# Patient Record
Sex: FEMALE | Race: WHITE
[De-identification: names, ages, dates, MRNs, and addresses within clinical notes are randomized per-mention and may not be internally consistent; named-entity substitution may affect disease eponyms.]

---

## 2024-01-05 PROBLEM — I34.0 MITRAL REGURGITATION: Status: ACTIVE | Noted: 2024-01-05

## 2024-01-08 ENCOUNTER — APPOINTMENT (OUTPATIENT)
Dept: CARDIOTHORACIC SURGERY | Facility: CLINIC | Age: 74
End: 2024-01-08
Payer: MEDICARE

## 2024-01-08 ENCOUNTER — NON-APPOINTMENT (OUTPATIENT)
Age: 74
End: 2024-01-08

## 2024-01-08 DIAGNOSIS — Z87.891 PERSONAL HISTORY OF NICOTINE DEPENDENCE: ICD-10-CM

## 2024-01-08 DIAGNOSIS — Z82.3 FAMILY HISTORY OF STROKE: ICD-10-CM

## 2024-01-08 DIAGNOSIS — I34.0 NONRHEUMATIC MITRAL (VALVE) INSUFFICIENCY: ICD-10-CM

## 2024-01-08 PROCEDURE — 99204 OFFICE O/P NEW MOD 45 MIN: CPT

## 2024-01-08 RX ORDER — NICOTINE 7MG/24HR
7 PATCH, TRANSDERMAL 24 HOURS TRANSDERMAL
Refills: 0 | Status: DISCONTINUED | COMMUNITY
End: 2024-01-08

## 2024-01-09 NOTE — END OF VISIT
[Time Spent: ___ minutes] : I have spent [unfilled] minutes of time on the encounter. [FreeTextEntry3] : I, JAVAD Dhillon , personally performed the evaluation and management (E/M) services for this new patient.  That E/M includes conducting the clinically appropriate initial history &/or exam, assessing all conditions, and establishing the plan of care.  Today, my CARA, was here to observe &/or participate in the visit & follow plan of care established by me.

## 2024-01-09 NOTE — PHYSICAL EXAM
[General Appearance - Alert] : alert [General Appearance - In No Acute Distress] : in no acute distress [Sclera] : the sclera and conjunctiva were normal [Extraocular Movements] : extraocular movements were intact [Outer Ear] : the ears and nose were normal in appearance [Neck Cervical Mass (___cm)] : no neck mass was observed [Neck Appearance] : the appearance of the neck was normal [Auscultation Breath Sounds / Voice Sounds] : lungs were clear to auscultation bilaterally [Heart Sounds Gallop] : no gallops [Heart Sounds] : normal S1 and S2 [2+] : left 2+ [Bowel Sounds] : normal bowel sounds [Abdomen Tenderness] : non-tender [Abdomen Soft] : soft [] : no hepato-splenomegaly [Abdomen Mass (___ Cm)] : no abdominal mass palpated [Abnormal Walk] : normal gait [Skin Color & Pigmentation] : normal skin color and pigmentation [Cranial Nerves] : cranial nerves 2-12 were intact [Oriented To Time, Place, And Person] : oriented to person, place, and time [Right Carotid Bruit] : no bruit heard over the right carotid [Left Carotid Bruit] : no bruit heard over the left carotid [FreeTextEntry1] : Deferred

## 2024-01-09 NOTE — HISTORY OF PRESENT ILLNESS
[FreeTextEntry1] : 72 yo F , recent former smoker (quit 12/2023) with PMHx of HTN, newly diagnosed sCHF (EF 25%), Afib (on Eliquis), CAD, severe MR, severe AI who presents for surgical consultation for management of CAD, severe MR, severe AI and ascending aorta dilatation. Pt has an appointment with cardiologist Dr. Rock however has not established care as of yet.   Pt recently presented to [facility] with reports of palpitations, LOPEZ with ambulating 50 feet and elevated BP at home, was found to be overloaded on exam and was admitted for new onset CHF exacerbation and new onset Afib from 12/26-12/31/23. Pt was IV diuresed and treated w/ Amiodarone. She was discharged home to follow up with CTS as outpatient. Pt underwent the following tests during this hospitalization.  - ECHO revealing EF 25%, RV normal size, LA dilated, severe MR, severe AI, PASP 35mmHg, ascending aorta 4.7cm.  - JEVON 12/28/23 revealing severe global hypokinesis of the LV, EF 30%, RV normal size, LA dilated, no evidence of LA thrombus, small PFO, mod-sev MR, trileflet AV with severe AI, mod TR, ascending aorta dilatation of 5.9cm, mod plaque in the descending aorta and arch.  - R/LHC 12/28/23 revealed dLAD filled by collaterals from RPDA, mLAD 99% culprit lesion, LCx normal, mRCA 50%, iFR 0.9, mod-sev AI, dilated aortic root and arch. PCWP 10mmHg. No pulm HTN.  - CTA chest/abd/pelvis 12/29/23 revealed aortic root 4.2cm, ascending aorta 6.2cm, descending aorta 3.5 cm, beaded appearance of the descending thoracic and abdominal aorta suggestive of vasculitis such as fibromuscular dysplasia.  - Pt underwent vasculitis workup, results pending.  - B/L LE Duplex 12/27/23 neg for DVT.  - Carotid duplex 12/30/23 without significant stenosis.   Pt presents accompanied by her daughter in law. Pt currently reports feeling better than she did in the hospital. She does endorse decreased exercise tolerance, unable to walk more than 2 blocks without having to stop as well as fatigue. Pt denies fever, chills, fatigue, headache, blurred vision, dizziness, syncope, chest pain, palpitations, orthopnea, paroxysmal nocturnal dyspnea, nausea, vomiting, abdominal pain, back pain, BRBPR or swelling to legs.

## 2024-01-09 NOTE — PROCEDURE
[FreeTextEntry1] : Patient was advised to view the educational video prior to this visit regarding aortic pathology, risk factors, surgical procedures, and lifestyle modifications. Video can be retrieved at [email or web address]

## 2024-01-09 NOTE — ASSESSMENT
[FreeTextEntry1] : 74 yo F , recent former smoker (quit 12/2023) with PMHx of HTN, newly diagnosed sCHF (EF 25%), Afib (on Eliquis), CAD, severe MR, severe AI who presents for surgical consultation for management of CAD, severe MR, severe AI and ascending aorta dilatation.  I have discussed the indications for surgery which include: decrease ejection fraction and worsening aortic regurgitation on echocardiogram, signs and symptoms of congestive heart failure, and other necessary cardiac procedures such as coronary artery bypass grafting, aortic root and ascending aorta replacement.  I have reviewed the patient's medical records, diagnostic images during the time of this office consultation and have made the following recommendation. I have reviewed the indications for surgery and used our webpage [email or web address] [email or web address] to illustrate the aorta and anatomy of the heart. I have discussed the indications for surgery with the patient. Those indications are the following: size greater than 5.0 cm, symptomatic aneurysms, family history of aortic dissection or aneurysm death with a size greater than 4.5 cm, other necessary cardiac procedures such as coronary artery bypass grafting or valvular disorders with an aneurysm greater than 4.5 cm, or connective tissue disorders with an aneurysm size greater than 4.5 cm. The patient meets the indications.  I had a lengthy discussion with the patient regarding his aneurysmal and valvular disease and progression. I have recommended that the patient is a candidate for .   I have discussed the risks, benefits and alternatives to surgery. I have explained the risks of the surgery, including approximately 5-10 % major mortality or morbidity including stroke, infection, bleeding, death, renal failure and heart attack. There is a 2 - 4% risk of requiring a PPM in patients with aortic regurgitation and a 4-6% risk for patients with aortic stenosis. There is a 20% risk of temporary atrial fibrillation post-surgery. All questions were addressed, and patient agrees to proceed with surgery.   I had a lengthy discussion with the patient regarding his valvular disease and progression. I have recommended that the patient is a candidate for aortic root, ascending aorta replacement, bioprosthetic aortic valve replacement, single vessel bypass of the LAD, RICKI clip with possible cryomaze and bioprosthetic mitral valve replacement.    The patient was educated on various valve options.  I have described the mechanical valve prosthesis which does require Coumadin therapy with a daily pill as well as frequent lab tests. The mechanical valve has excellent longevity and is usually only removed in the cases of infective bacterial prosthetic valve endocarditis or pannus formation. Approximately over 90% of mechanical valves never need to be removed. However, there is a risk with Coumadin, a blood thinner, approximately a 1% thromboembolic risk per year. The On-x mechanical valve was also discussed, which requires a lower Coumadin dosage and INR therapeutic range. The other valve option is a biological valve. In general, approximately 50% of these need to be removed at approximately 15 years. However, these valves do not require Coumadin therapy and, therefore, do not have the associated risks of the blood thinner. I discussed that with the current use of Transcatheter Aortic Valve Replacement (TAVR), there is a possibility that future replacement of a failing bioprosthetic valve by TAVR may be an option. The Inspira and MagnaEase valves and their morphology fitted for future TAVRs was discussed as well.   The patient has chosen a bioprosthetic aortic valve.   --dental clearance   - Continue medication regimen. - Follow up with cardiologist and PCP. - Blood pressure management. - Discussed signs and symptoms that warrant emergency medical attention. - Pt is agreeable to surgery- date TBD, likely Thursday 1/18/24 with admission 2 days prior for HF optimization.

## 2024-01-10 ENCOUNTER — APPOINTMENT (OUTPATIENT)
Dept: CARDIOLOGY | Facility: CLINIC | Age: 74
End: 2024-01-10
Payer: MEDICARE

## 2024-01-10 ENCOUNTER — NON-APPOINTMENT (OUTPATIENT)
Age: 74
End: 2024-01-10

## 2024-01-10 VITALS
OXYGEN SATURATION: 97 % | WEIGHT: 120 LBS | HEIGHT: 64 IN | BODY MASS INDEX: 20.49 KG/M2 | SYSTOLIC BLOOD PRESSURE: 129 MMHG | HEART RATE: 52 BPM | DIASTOLIC BLOOD PRESSURE: 72 MMHG

## 2024-01-10 PROCEDURE — G2211 COMPLEX E/M VISIT ADD ON: CPT

## 2024-01-10 PROCEDURE — 99205 OFFICE O/P NEW HI 60 MIN: CPT

## 2024-01-10 PROCEDURE — 93000 ELECTROCARDIOGRAM COMPLETE: CPT

## 2024-01-10 NOTE — PHYSICAL EXAM
[Well Developed] : well developed [Well Nourished] : well nourished [No Acute Distress] : no acute distress [Normal Conjunctiva] : normal conjunctiva [Normal Venous Pressure] : normal venous pressure [No Carotid Bruit] : no carotid bruit [Normal S1, S2] : normal S1, S2 [No Rub] : no rub [No Gallop] : no gallop [Clear Lung Fields] : clear lung fields [Good Air Entry] : good air entry [No Respiratory Distress] : no respiratory distress  [Soft] : abdomen soft [Non Tender] : non-tender [No Masses/organomegaly] : no masses/organomegaly [Normal Bowel Sounds] : normal bowel sounds [Normal Gait] : normal gait [No Edema] : no edema [No Cyanosis] : no cyanosis [No Clubbing] : no clubbing [No Varicosities] : no varicosities [No Rash] : no rash [No Skin Lesions] : no skin lesions [Moves all extremities] : moves all extremities [No Focal Deficits] : no focal deficits [Normal Speech] : normal speech [Alert and Oriented] : alert and oriented [Normal memory] : normal memory [de-identified] : 2/6 systolic murmur.  2/4 diastolic murmur.

## 2024-01-10 NOTE — HISTORY OF PRESENT ILLNESS
[FreeTextEntry1] : This is a 73 year old former smoker who presents to the office to establish care with a cardiologist.  In December of 2023 she presented to [facility] in [place] with acute decompensated heart failure.  She was found to be in atrial fibrillation as well.  She underwent a cardiac work up, which revealed a severely dilated ascending aorta measuring 6 cm, severe aortic insufficiency, moderate to severe mitral insufficiency, and a severe mid LAD stenosis.  She was diuresed, put on AC, started on GDMT, and sinus rhythm was restored.  She has seen Annamaria Oro and Maximilian, and is deciding about surgery.  She is here for an opinion regarding her candidacy and the need for surgery.  Since discharge, her dyspnea and LE edema is much improved.  She is not having chest pain. She denies  PND, orthopnea, LE swelling, dizziness, or syncope.  No abnormal bleeding.  She is able to perform her activities of daily living with no increased dyspnea.  She quit smoking when she entered the hospital.  She is medication and dietary compliant.

## 2024-01-10 NOTE — DISCUSSION/SUMMARY
[FreeTextEntry1] : This is a 73 year old former smoker who presents to the office to establish care with a cardiologist.  In December of 2023 she presented to [facility] in [place] with acute decompensated heart failure.  She was found to be in atrial fibrillation as well.  She underwent a cardiac work up, which revealed a severely dilated ascending aorta measuring 6 cm, severe aortic insufficiency, moderate to severe mitral insufficiency, and a severe mid LAD stenosis.  She was diuresed, put on AC, started on GDMT, and sinus rhythm was restored.  She has seen Annamaria Oro and Maximilian, and is deciding about surgery.  She is here for an opinion regarding her candidacy and the need for surgery.   I explained that she is unlikely to have a good outcome without surgery.    I think that she needs an ascending aortic replacement, bioprosthetic aortic valve replacement, single vessel bypass to the LAD, and possible TV repair, MV repair, surgical MAZE,and RICKI ligation.  She is well compensated at the current point.  She will continue Toprol, Entresto, Aldactone at the current doses.  She is maintaining NSR on amiodarone, and will continue AC with Eliquis for now.  I advised activity restriction, and that she schedule a surgical date ASAP.  She will follow with me thereafter.  They know to call the office with any issues.  [EKG obtained to assist in diagnosis and management of assessed problem(s)] : EKG obtained to assist in diagnosis and management of assessed problem(s)

## 2024-01-30 ENCOUNTER — APPOINTMENT (OUTPATIENT)
Dept: CARDIOLOGY | Facility: CLINIC | Age: 74
End: 2024-01-30
Payer: MEDICARE

## 2024-01-30 LAB
INR PPP: 1.3 RATIO
QUALITY CONTROL: YES

## 2024-01-30 PROCEDURE — 85610 PROTHROMBIN TIME: CPT | Mod: QW

## 2024-01-30 PROCEDURE — 93793 ANTICOAG MGMT PT WARFARIN: CPT

## 2024-02-07 ENCOUNTER — APPOINTMENT (OUTPATIENT)
Dept: CARDIOLOGY | Facility: CLINIC | Age: 74
End: 2024-02-07
Payer: MEDICARE

## 2024-02-07 VITALS
OXYGEN SATURATION: 98 % | HEART RATE: 85 BPM | DIASTOLIC BLOOD PRESSURE: 80 MMHG | SYSTOLIC BLOOD PRESSURE: 130 MMHG | WEIGHT: 120 LBS | BODY MASS INDEX: 20.49 KG/M2 | HEIGHT: 64 IN

## 2024-02-07 DIAGNOSIS — I50.20 UNSPECIFIED SYSTOLIC (CONGESTIVE) HEART FAILURE: ICD-10-CM

## 2024-02-07 DIAGNOSIS — I35.1 NONRHEUMATIC AORTIC (VALVE) INSUFFICIENCY: ICD-10-CM

## 2024-02-07 DIAGNOSIS — Z00.00 ENCOUNTER FOR GENERAL ADULT MEDICAL EXAMINATION W/OUT ABNORMAL FINDINGS: ICD-10-CM

## 2024-02-07 LAB
INR PPP: 1.1 RATIO
QUALITY CONTROL: YES

## 2024-02-07 PROCEDURE — 99215 OFFICE O/P EST HI 40 MIN: CPT

## 2024-02-07 PROCEDURE — G2211 COMPLEX E/M VISIT ADD ON: CPT

## 2024-02-07 PROCEDURE — 85610 PROTHROMBIN TIME: CPT | Mod: QW

## 2024-02-07 PROCEDURE — 93000 ELECTROCARDIOGRAM COMPLETE: CPT

## 2024-02-07 PROCEDURE — 93793 ANTICOAG MGMT PT WARFARIN: CPT

## 2024-02-07 RX ORDER — SPIRONOLACTONE 25 MG/1
25 TABLET ORAL DAILY
Refills: 0 | Status: DISCONTINUED | COMMUNITY
End: 2024-02-07

## 2024-02-07 RX ORDER — ASPIRIN 81 MG/1
81 TABLET, COATED ORAL
Qty: 90 | Refills: 3 | Status: ACTIVE | COMMUNITY
Start: 2024-02-07

## 2024-02-07 RX ORDER — METOPROLOL SUCCINATE 50 MG/1
50 TABLET, EXTENDED RELEASE ORAL DAILY
Refills: 0 | Status: DISCONTINUED | COMMUNITY
End: 2024-02-07

## 2024-02-07 RX ORDER — APIXABAN 5 MG/1
5 TABLET, FILM COATED ORAL
Qty: 180 | Refills: 0 | Status: DISCONTINUED | COMMUNITY
End: 2024-02-07

## 2024-02-07 RX ORDER — SACUBITRIL AND VALSARTAN 24; 26 MG/1; MG/1
24-26 TABLET, FILM COATED ORAL TWICE DAILY
Refills: 0 | Status: DISCONTINUED | COMMUNITY
End: 2024-02-07

## 2024-02-07 NOTE — HISTORY OF PRESENT ILLNESS
[FreeTextEntry1] : This is a 73 year old former smoker who presents to the office for a follow up cardiac evaluation after cardiac surgery.  .  In December of 2023 she presented to [facility] in [place] with acute decompensated heart failure.  She was found to be in atrial fibrillation as well.  She underwent a cardiac work up, which revealed a severely dilated ascending aorta measuring 6 cm, severe aortic insufficiency, moderate to severe mitral insufficiency, and a severe mid LAD stenosis.  She was diuresed, put on AC, started on GDMT, and sinus rhythm was restored.  She followed with Dr. Oro, and is now s/p ascending aortic replacement with a Dacron graft, bioprosthetic aortic valve replacement, mitral valve and tricuspid valve annuloplasty, 2V CABG with LIMA to LAD and SVG to RCA, RICKI ligation.  Her post operative course was complicated by jayy atrial fibrillation.  PPM was deferred.  Toprol was stopped.  Entresto and Aldactone were also stopped.  She was discharged in NSR, but is now back in AF.  Her HR is about 100.  Eliquis was changed to Coumadin.  She reports her dyspnea is slightly better than pre op.  She has incisional pain.  She denies anginal chest pains, PND, orthopnea, LE swelling, dizziness, or syncope.  NO abnormal bleeding.

## 2024-02-07 NOTE — PHYSICAL EXAM
[Well Developed] : well developed [Well Nourished] : well nourished [No Acute Distress] : no acute distress [Normal Conjunctiva] : normal conjunctiva [Normal Venous Pressure] : normal venous pressure [No Carotid Bruit] : no carotid bruit [Normal S1, S2] : normal S1, S2 [No Rub] : no rub [No Gallop] : no gallop [Clear Lung Fields] : clear lung fields [Good Air Entry] : good air entry [No Respiratory Distress] : no respiratory distress  [Soft] : abdomen soft [Non Tender] : non-tender [No Masses/organomegaly] : no masses/organomegaly [Normal Bowel Sounds] : normal bowel sounds [Normal Gait] : normal gait [No Edema] : no edema [No Cyanosis] : no cyanosis [No Clubbing] : no clubbing [No Varicosities] : no varicosities [No Rash] : no rash [No Skin Lesions] : no skin lesions [Moves all extremities] : moves all extremities [No Focal Deficits] : no focal deficits [Normal Speech] : normal speech [Alert and Oriented] : alert and oriented [Normal memory] : normal memory [de-identified] : 2/6 systolic murmur.  IRRR

## 2024-02-07 NOTE — DISCUSSION/SUMMARY
[FreeTextEntry1] : This is a 73 year old former smoker who presents to the office for a follow up cardiac evaluation after cardiac surgery.  .  In December of 2023 she presented to [facility] in [place] with acute decompensated heart failure.  She was found to be in atrial fibrillation as well.  She underwent a cardiac work up, which revealed a severely dilated ascending aorta measuring 6 cm, severe aortic insufficiency, moderate to severe mitral insufficiency, and a severe mid LAD stenosis.  She was diuresed, put on AC, started on GDMT, and sinus rhythm was restored.  She followed with Dr. Oro, and is now s/p ascending aortic replacement with a Dacron graft, bioprosthetic aortic valve replacement, mitral valve and tricuspid valve annuloplasty, 2V CABG with LIMA to LAD and SVG to RCA, RICKI ligation.  Her post operative course was complicated by jayy atrial fibrillation.  PPM was deferred.  Toprol was stopped.  Entresto and Aldactone were also stopped.  She was discharged in NSR, but is now back in AF.  She is going to be started back on Toprol 25 QD.  I am placing a Zio AT given her jayy issues in the hospital.  She will continue Coumadin for one month with a goal INR 2.5-3.5.  Her INR is being managed in our office.  At one month, she will be changed back to Eliquis.  I am sending a full set of repeat blood work.  She will need a repeat echocardiogram.  I plan to eventually add back GDMT slowly with Entresto next, followed by eplerenone and Farxiga.  She appears euvolemic with no signs of angina.  She will continue her aspirin and statin drug.  She should be treated to an LDL of less than 70.  I will see her back in 1-2 weeks.  They know to call the office with any issues.  I discussed the hospital course with DR. Oro's NP.  They are faxing records over for our review.  [EKG obtained to assist in diagnosis and management of assessed problem(s)] : EKG obtained to assist in diagnosis and management of assessed problem(s)

## 2024-02-12 ENCOUNTER — APPOINTMENT (OUTPATIENT)
Dept: CARDIOLOGY | Facility: CLINIC | Age: 74
End: 2024-02-12
Payer: MEDICARE

## 2024-02-12 PROCEDURE — 85610 PROTHROMBIN TIME: CPT | Mod: QW

## 2024-02-12 PROCEDURE — 93793 ANTICOAG MGMT PT WARFARIN: CPT

## 2024-02-13 LAB
ALBUMIN SERPL ELPH-MCNC: 3.9 G/DL
ALP BLD-CCNC: 138 U/L
ALT SERPL-CCNC: 13 U/L
ANION GAP SERPL CALC-SCNC: 13 MMOL/L
AST SERPL-CCNC: 12 U/L
BILIRUB SERPL-MCNC: 0.6 MG/DL
BUN SERPL-MCNC: 10 MG/DL
CALCIUM SERPL-MCNC: 9.2 MG/DL
CHLORIDE SERPL-SCNC: 107 MMOL/L
CHOLEST SERPL-MCNC: 132 MG/DL
CO2 SERPL-SCNC: 26 MMOL/L
CREAT SERPL-MCNC: 0.67 MG/DL
EGFR: 93 ML/MIN/1.73M2
ESTIMATED AVERAGE GLUCOSE: 105 MG/DL
GLUCOSE SERPL-MCNC: 83 MG/DL
HBA1C MFR BLD HPLC: 5.3 %
HCT VFR BLD CALC: 33.3 %
HDLC SERPL-MCNC: 44 MG/DL
HGB BLD-MCNC: 10.1 G/DL
INR PPP: 1.1 RATIO
LDLC SERPL CALC-MCNC: 63 MG/DL
MCHC RBC-ENTMCNC: 28.9 PG
MCHC RBC-ENTMCNC: 30.3 GM/DL
MCV RBC AUTO: 95.4 FL
NONHDLC SERPL-MCNC: 88 MG/DL
PLATELET # BLD AUTO: 380 K/UL
POTASSIUM SERPL-SCNC: 4.5 MMOL/L
PROT SERPL-MCNC: 6.3 G/DL
QUALITY CONTROL: YES
RBC # BLD: 3.49 M/UL
RBC # FLD: 14.2 %
SODIUM SERPL-SCNC: 146 MMOL/L
TRIGL SERPL-MCNC: 144 MG/DL
WBC # FLD AUTO: 6.64 K/UL

## 2024-02-16 ENCOUNTER — APPOINTMENT (OUTPATIENT)
Dept: CARDIOLOGY | Facility: CLINIC | Age: 74
End: 2024-02-16
Payer: MEDICARE

## 2024-02-16 ENCOUNTER — NON-APPOINTMENT (OUTPATIENT)
Age: 74
End: 2024-02-16

## 2024-02-16 VITALS
SYSTOLIC BLOOD PRESSURE: 139 MMHG | HEART RATE: 89 BPM | WEIGHT: 119 LBS | BODY MASS INDEX: 20.32 KG/M2 | DIASTOLIC BLOOD PRESSURE: 84 MMHG | OXYGEN SATURATION: 98 % | HEIGHT: 64 IN

## 2024-02-16 LAB
INR PPP: 1.2 RATIO
QUALITY CONTROL: YES

## 2024-02-16 PROCEDURE — 85610 PROTHROMBIN TIME: CPT | Mod: QW

## 2024-02-16 PROCEDURE — 93000 ELECTROCARDIOGRAM COMPLETE: CPT

## 2024-02-16 PROCEDURE — G2211 COMPLEX E/M VISIT ADD ON: CPT

## 2024-02-16 PROCEDURE — 99215 OFFICE O/P EST HI 40 MIN: CPT

## 2024-02-16 RX ORDER — WARFARIN 3 MG/1
3 TABLET ORAL DAILY
Qty: 90 | Refills: 3 | Status: DISCONTINUED | COMMUNITY
Start: 2024-02-16 | End: 2024-02-16

## 2024-02-16 RX ORDER — WARFARIN 1 MG/1
1 TABLET ORAL
Qty: 180 | Refills: 3 | Status: DISCONTINUED | COMMUNITY
Start: 2024-02-07 | End: 2024-02-16

## 2024-02-16 NOTE — HISTORY OF PRESENT ILLNESS
[FreeTextEntry1] : This is a 73 year old former smoker who presents to the office for a follow up cardiac evaluation after cardiac surgery.  .  In December of 2023 she presented to [facility] in [place] with acute decompensated heart failure.  She was found to be in atrial fibrillation as well.  She underwent a cardiac work up, which revealed a severely dilated ascending aorta measuring 6 cm, severe aortic insufficiency, moderate to severe mitral insufficiency, and a severe mid LAD stenosis.  She was diuresed, put on AC, started on GDMT, and sinus rhythm was restored.  She followed with Dr. Oro, and is now s/p ascending aortic replacement with a Dacron graft, bioprosthetic aortic valve replacement, mitral valve and tricuspid valve annuloplasty, 2V CABG with LIMA to LAD and SVG to RCA, RICKI ligation.  Her post operative course was complicated by jayy atrial fibrillation.  PPM was deferred.  I resumed Toprol at her last visit.   Entresto and Aldactone were also stopped.  She was discharged in NSR, but was in AF at her last visit.  I placed a Zio AT, and her AF burden is decreasing.   She is in NSR today.  Eliquis was changed to Coumadin.  She reports her dyspnea is slightly better than pre op.  She has incisional pain.  She denies anginal chest pains, PND, orthopnea, LE swelling, dizziness, or syncope.  NO abnormal bleeding.

## 2024-02-16 NOTE — DISCUSSION/SUMMARY
[FreeTextEntry1] : This is a 73 year old former smoker who presents to the office for a follow up cardiac evaluation after cardiac surgery.  .  In December of 2023 she presented to [facility] in [place] with acute decompensated heart failure.  She was found to be in atrial fibrillation as well.  She underwent a cardiac work up, which revealed a severely dilated ascending aorta measuring 6 cm, severe aortic insufficiency, moderate to severe mitral insufficiency, and a severe mid LAD stenosis.  She was diuresed, put on AC, started on GDMT, and sinus rhythm was restored.  She followed with Dr. Oro, and is now s/p ascending aortic replacement with a Dacron graft, bioprosthetic aortic valve replacement, mitral valve and tricuspid valve annuloplasty, 2V CABG with LIMA to LAD and SVG to RCA, RICKI ligation.  Her post operative course was complicated by jayy atrial fibrillation.  PPM was deferred.  She is wearing a Zio AT, and her AF burden seems to be decreasing.  She is in NSR today.  I am increasing Toprol to 50 QD.  Entresto and Aldactone were also stopped.  I am stopping Coumadin and starting Eliquis 5 bid.  She will need a repeat echocardiogram.  I plan to eventually add back GDMT slowly with Entresto next, followed by eplerenone and Farxiga.  She appears euvolemic with no signs of angina.  She will continue her aspirin and statin drug.  Her LDL is 63.  She will follow in one month.  They know to call the office with any issues.      [EKG obtained to assist in diagnosis and management of assessed problem(s)] : EKG obtained to assist in diagnosis and management of assessed problem(s)

## 2024-02-16 NOTE — END OF VISIT
[4. Prevent psychological harm to patient or others] : Reason - Prevent psychological harm to patient or others independent

## 2024-02-16 NOTE — PHYSICAL EXAM
[Well Developed] : well developed [Well Nourished] : well nourished [No Acute Distress] : no acute distress [Normal Conjunctiva] : normal conjunctiva [Normal Venous Pressure] : normal venous pressure [No Carotid Bruit] : no carotid bruit [Normal S1, S2] : normal S1, S2 [No Rub] : no rub [No Gallop] : no gallop [Clear Lung Fields] : clear lung fields [Good Air Entry] : good air entry [No Respiratory Distress] : no respiratory distress  [Soft] : abdomen soft [Non Tender] : non-tender [No Masses/organomegaly] : no masses/organomegaly [Normal Bowel Sounds] : normal bowel sounds [Normal Gait] : normal gait [No Edema] : no edema [No Cyanosis] : no cyanosis [No Clubbing] : no clubbing [No Varicosities] : no varicosities [No Rash] : no rash [No Skin Lesions] : no skin lesions [Moves all extremities] : moves all extremities [No Focal Deficits] : no focal deficits [Normal Speech] : normal speech [Alert and Oriented] : alert and oriented [Normal memory] : normal memory [de-identified] : 2/6 systolic murmur.  RRR

## 2024-03-06 ENCOUNTER — APPOINTMENT (OUTPATIENT)
Dept: CARDIOLOGY | Facility: CLINIC | Age: 74
End: 2024-03-06
Payer: MEDICARE

## 2024-03-06 ENCOUNTER — NON-APPOINTMENT (OUTPATIENT)
Age: 74
End: 2024-03-06

## 2024-03-06 VITALS
HEIGHT: 64 IN | HEART RATE: 62 BPM | SYSTOLIC BLOOD PRESSURE: 143 MMHG | DIASTOLIC BLOOD PRESSURE: 82 MMHG | WEIGHT: 117 LBS | OXYGEN SATURATION: 94 % | BODY MASS INDEX: 19.97 KG/M2

## 2024-03-06 DIAGNOSIS — I71.21 ANEURYSM OF THE ASCENDING AORTA, WITHOUT RUPTURE: ICD-10-CM

## 2024-03-06 PROCEDURE — 93000 ELECTROCARDIOGRAM COMPLETE: CPT

## 2024-03-06 PROCEDURE — 99215 OFFICE O/P EST HI 40 MIN: CPT

## 2024-03-06 PROCEDURE — G2211 COMPLEX E/M VISIT ADD ON: CPT

## 2024-03-06 NOTE — DISCUSSION/SUMMARY
[EKG obtained to assist in diagnosis and management of assessed problem(s)] : EKG obtained to assist in diagnosis and management of assessed problem(s) [FreeTextEntry1] : This is a 73 year old former smoker who presents to the office for a follow up cardiac evaluation after cardiac surgery.  .  In December of 2023 she presented to [facility] in [place] with acute decompensated heart failure.  She was found to be in atrial fibrillation as well.  She underwent a cardiac work up, which revealed a severely dilated ascending aorta measuring 6 cm, severe aortic insufficiency, moderate to severe mitral insufficiency, and a severe mid LAD stenosis.  She was diuresed, put on AC, started on GDMT, and sinus rhythm was restored.  She followed with Dr. Oro, and is now s/p ascending aortic replacement with a Dacron graft, bioprosthetic aortic valve replacement, mitral valve and tricuspid valve annuloplasty, 2V CABG with LIMA to LAD and SVG to RCA, RICKI ligation.  Her post operative course was complicated by jayy atrial fibrillation.  PPM was deferred.  She needs a second Zio patch.  Her AF burden seems to be decreasing.   She is in NSR today.  She will continue Toprol 50 QD.   I am resuming Entresto at 24-26 bid.  I will resume eplerenone at the next visit.  She will continue Eliquis for stroke risk reduction.    She will need a repeat echocardiogram.  Farxiga will eventually be resumed.  She appears euvolemic with no signs of angina.  She will continue her aspirin and statin drug.  Her LDL is 63.  She will follow in one month.  They know to call the office with any issues.

## 2024-03-06 NOTE — HISTORY OF PRESENT ILLNESS
[FreeTextEntry1] : This is a 73 year old former smoker who presents to the office for a follow up cardiac evaluation after cardiac surgery.  .  In December of 2023 she presented to [facility] in [place] with acute decompensated heart failure.  She was found to be in atrial fibrillation as well.  She underwent a cardiac work up, which revealed a severely dilated ascending aorta measuring 6 cm, severe aortic insufficiency, moderate to severe mitral insufficiency, and a severe mid LAD stenosis.  She was diuresed, put on AC, started on GDMT, and sinus rhythm was restored.  She followed with Dr. Oro, and is now s/p ascending aortic replacement with a Dacron graft, bioprosthetic aortic valve replacement, mitral valve and tricuspid valve annuloplasty, 2V CABG with LIMA to LAD and SVG to RCA, RICKI ligation.  Her post operative course was complicated by jayy atrial fibrillation.  PPM was deferred.  I resumed Toprol.  She has been taking too much it appears, and is intermittently dizzy.  She was discharged in NSR, but has been in and out of AF.  She seems mostly in sinus now, and needs to wear her second monitor.  She is in NSR today.   She reports her dyspnea is slightly better than pre op.  She has incisional pain.  She denies anginal chest pains, PND, orthopnea, LE swelling, dizziness, or syncope.  NO abnormal bleeding.

## 2024-03-06 NOTE — PHYSICAL EXAM
[Well Developed] : well developed [Well Nourished] : well nourished [No Acute Distress] : no acute distress [Normal Venous Pressure] : normal venous pressure [Normal Conjunctiva] : normal conjunctiva [No Carotid Bruit] : no carotid bruit [Normal S1, S2] : normal S1, S2 [No Rub] : no rub [No Gallop] : no gallop [Clear Lung Fields] : clear lung fields [Good Air Entry] : good air entry [No Respiratory Distress] : no respiratory distress  [Soft] : abdomen soft [Non Tender] : non-tender [No Masses/organomegaly] : no masses/organomegaly [Normal Bowel Sounds] : normal bowel sounds [No Edema] : no edema [Normal Gait] : normal gait [No Cyanosis] : no cyanosis [No Varicosities] : no varicosities [No Clubbing] : no clubbing [No Rash] : no rash [No Skin Lesions] : no skin lesions [Moves all extremities] : moves all extremities [No Focal Deficits] : no focal deficits [Normal Speech] : normal speech [Alert and Oriented] : alert and oriented [de-identified] : 2/6 systolic murmur.  RRR [Normal memory] : normal memory

## 2024-04-03 ENCOUNTER — NON-APPOINTMENT (OUTPATIENT)
Age: 74
End: 2024-04-03

## 2024-04-03 ENCOUNTER — APPOINTMENT (OUTPATIENT)
Dept: CARDIOLOGY | Facility: CLINIC | Age: 74
End: 2024-04-03
Payer: MEDICARE

## 2024-04-03 VITALS
HEART RATE: 64 BPM | SYSTOLIC BLOOD PRESSURE: 138 MMHG | DIASTOLIC BLOOD PRESSURE: 84 MMHG | HEIGHT: 64 IN | BODY MASS INDEX: 20.49 KG/M2 | OXYGEN SATURATION: 96 % | WEIGHT: 120 LBS

## 2024-04-03 DIAGNOSIS — I10 ESSENTIAL (PRIMARY) HYPERTENSION: ICD-10-CM

## 2024-04-03 PROCEDURE — G2211 COMPLEX E/M VISIT ADD ON: CPT

## 2024-04-03 PROCEDURE — 93000 ELECTROCARDIOGRAM COMPLETE: CPT

## 2024-04-03 PROCEDURE — 99215 OFFICE O/P EST HI 40 MIN: CPT

## 2024-04-03 RX ORDER — FAMOTIDINE 20 MG/1
20 TABLET, FILM COATED ORAL
Qty: 30 | Refills: 3 | Status: DISCONTINUED | COMMUNITY
Start: 2024-02-07 | End: 2024-04-03

## 2024-04-03 NOTE — DISCUSSION/SUMMARY
[FreeTextEntry1] : This is a 73 year old former smoker who presents to the office for a follow up cardiac evaluation after cardiac surgery.  .  In December of 2023 she presented to [facility] in [place] with acute decompensated heart failure.  She was found to be in atrial fibrillation as well.  She underwent a cardiac work up, which revealed a severely dilated ascending aorta measuring 6 cm, severe aortic insufficiency, moderate to severe mitral insufficiency, and a severe mid LAD stenosis.  She was diuresed, put on AC, started on GDMT, and sinus rhythm was restored.  She followed with Dr. Oro, and is now s/p ascending aortic replacement with a Dacron graft, bioprosthetic aortic valve replacement, mitral valve and tricuspid valve annuloplasty, 2V CABG with LIMA to LAD and SVG to RCA, RICKI ligation.  Her post operative course was complicated by jayy atrial fibrillation.  PPM was deferred.  She sent back her second Zio patch, and this will be followed.  Hopefully she maintained NSR throughout.  If so, I plan to stop amiodarone on May 1.    She is in NSR today.  She will continue Toprol 50 QD.   She will continue Entresto at 24-26 bid.  I will resume eplerenone at 25 QD.    She will continue Eliquis for stroke risk reduction.    She will need a repeat echocardiogram.  Farxiga will eventually be resumed.  She appears euvolemic with no signs of angina.  She will continue her aspirin and statin drug.  Her LDL is 63.  She will follow in one month and come fasting for a full set of blood work.  They know to call the office with any issues.      [EKG obtained to assist in diagnosis and management of assessed problem(s)] : EKG obtained to assist in diagnosis and management of assessed problem(s)

## 2024-04-03 NOTE — HISTORY OF PRESENT ILLNESS
[FreeTextEntry1] : This is a 73 year old former smoker who presents to the office for a follow up cardiac evaluation after cardiac surgery.  .  In December of 2023 she presented to [facility] in [place] with acute decompensated heart failure.  She was found to be in atrial fibrillation as well.  She underwent a cardiac work up, which revealed a severely dilated ascending aorta measuring 6 cm, severe aortic insufficiency, moderate to severe mitral insufficiency, and a severe mid LAD stenosis.  She was diuresed, put on AC, started on GDMT, and sinus rhythm was restored.  She followed with Dr. Oro, and is now s/p ascending aortic replacement with a Dacron graft, bioprosthetic aortic valve replacement, mitral valve and tricuspid valve annuloplasty, 2V CABG with LIMA to LAD and SVG to RCA, RICKI ligation.  Her post operative course was complicated by jayy atrial fibrillation.  PPM was deferred.  I resumed Toprol.   She was discharged in NSR, but had been in and out of AF.   She seems mostly in sinus now.  She wore a second Zio, and this will be followed.   She is in NSR today.   She reports her dyspnea is much improved compared to pre op.    She has incisional pain.  She denies anginal chest pains, PND, orthopnea, LE swelling, dizziness, or syncope.  NO abnormal bleeding.  Overall she is recovering well.

## 2024-04-03 NOTE — PHYSICAL EXAM
[Well Developed] : well developed [Well Nourished] : well nourished [No Acute Distress] : no acute distress [Normal Conjunctiva] : normal conjunctiva [Normal Venous Pressure] : normal venous pressure [No Carotid Bruit] : no carotid bruit [Normal S1, S2] : normal S1, S2 [No Rub] : no rub [No Gallop] : no gallop [Clear Lung Fields] : clear lung fields [Good Air Entry] : good air entry [No Respiratory Distress] : no respiratory distress  [Soft] : abdomen soft [Non Tender] : non-tender [No Masses/organomegaly] : no masses/organomegaly [Normal Bowel Sounds] : normal bowel sounds [Normal Gait] : normal gait [No Edema] : no edema [No Cyanosis] : no cyanosis [No Clubbing] : no clubbing [No Varicosities] : no varicosities [No Rash] : no rash [No Skin Lesions] : no skin lesions [Moves all extremities] : moves all extremities [No Focal Deficits] : no focal deficits [Normal Speech] : normal speech [Alert and Oriented] : alert and oriented [Normal memory] : normal memory [de-identified] : 2/6 systolic murmur.  RRR

## 2024-04-09 RX ORDER — EPLERENONE 25 MG/1
25 TABLET, COATED ORAL DAILY
Qty: 90 | Refills: 2 | Status: ACTIVE | COMMUNITY
Start: 2024-04-03

## 2024-04-11 ENCOUNTER — RX RENEWAL (OUTPATIENT)
Age: 74
End: 2024-04-11

## 2024-05-01 ENCOUNTER — RX RENEWAL (OUTPATIENT)
Age: 74
End: 2024-05-01

## 2024-05-09 ENCOUNTER — APPOINTMENT (OUTPATIENT)
Dept: CARDIOLOGY | Facility: CLINIC | Age: 74
End: 2024-05-09
Payer: MEDICARE

## 2024-05-09 ENCOUNTER — NON-APPOINTMENT (OUTPATIENT)
Age: 74
End: 2024-05-09

## 2024-05-09 VITALS
WEIGHT: 120 LBS | HEART RATE: 57 BPM | HEIGHT: 64 IN | DIASTOLIC BLOOD PRESSURE: 68 MMHG | SYSTOLIC BLOOD PRESSURE: 101 MMHG | OXYGEN SATURATION: 97 % | BODY MASS INDEX: 20.49 KG/M2

## 2024-05-09 DIAGNOSIS — I48.91 UNSPECIFIED ATRIAL FIBRILLATION: ICD-10-CM

## 2024-05-09 DIAGNOSIS — I25.10 ATHEROSCLEROTIC HEART DISEASE OF NATIVE CORONARY ARTERY W/OUT ANGINA PECTORIS: ICD-10-CM

## 2024-05-09 PROCEDURE — G2211 COMPLEX E/M VISIT ADD ON: CPT

## 2024-05-09 PROCEDURE — 99214 OFFICE O/P EST MOD 30 MIN: CPT

## 2024-05-09 PROCEDURE — 93000 ELECTROCARDIOGRAM COMPLETE: CPT

## 2024-05-09 RX ORDER — METOPROLOL SUCCINATE 25 MG/1
25 TABLET, EXTENDED RELEASE ORAL DAILY
Qty: 90 | Refills: 3 | Status: ACTIVE | COMMUNITY
Start: 2024-02-07 | End: 1900-01-01

## 2024-05-09 RX ORDER — AMIODARONE HYDROCHLORIDE 200 MG/1
200 TABLET ORAL DAILY
Qty: 90 | Refills: 2 | Status: DISCONTINUED | COMMUNITY
Start: 2024-05-01 | End: 2024-05-09

## 2024-05-09 RX ORDER — APIXABAN 5 MG/1
5 TABLET, FILM COATED ORAL
Qty: 180 | Refills: 3 | Status: ACTIVE | COMMUNITY
Start: 2024-02-16 | End: 1900-01-01

## 2024-05-09 NOTE — PHYSICAL EXAM
[Well Developed] : well developed [Well Nourished] : well nourished [No Acute Distress] : no acute distress [Normal Conjunctiva] : normal conjunctiva [Normal Venous Pressure] : normal venous pressure [No Carotid Bruit] : no carotid bruit [Normal S1, S2] : normal S1, S2 [No Rub] : no rub [No Gallop] : no gallop [Clear Lung Fields] : clear lung fields [Good Air Entry] : good air entry [No Respiratory Distress] : no respiratory distress  [Soft] : abdomen soft [Non Tender] : non-tender [No Masses/organomegaly] : no masses/organomegaly [Normal Bowel Sounds] : normal bowel sounds [Normal Gait] : normal gait [No Edema] : no edema [No Cyanosis] : no cyanosis [No Clubbing] : no clubbing [No Varicosities] : no varicosities [No Rash] : no rash [No Skin Lesions] : no skin lesions [Moves all extremities] : moves all extremities [No Focal Deficits] : no focal deficits [Normal Speech] : normal speech [Alert and Oriented] : alert and oriented [Normal memory] : normal memory [de-identified] : 2/6 systolic murmur.  RRR

## 2024-05-09 NOTE — DISCUSSION/SUMMARY
[FreeTextEntry1] : This is a 73 year old former smoker who presents to the office for a follow up cardiac evaluation after cardiac surgery.  .  In December of 2023 she presented to [facility] in [place] with acute decompensated heart failure.  She was found to be in atrial fibrillation as well.  She underwent a cardiac work up, which revealed a severely dilated ascending aorta measuring 6 cm, severe aortic insufficiency, moderate to severe mitral insufficiency, and a severe mid LAD stenosis.  She was diuresed, put on AC, started on GDMT, and sinus rhythm was restored.  She followed with Dr. Oro, and is now s/p ascending aortic replacement with a Dacron graft, bioprosthetic aortic valve replacement, mitral valve and tricuspid valve annuloplasty, 2V CABG with LIMA to LAD and SVG to RCA, RICKI ligation.  Her post operative course was complicated by jayy atrial fibrillation.  PPM was deferred.  She sent back her second Zio patch, and had no recurrent AF.   She has stopped amiodarone.  She is dizzy, and BP and HR are low.  I am reducing Toprol to 25 QD.    She will continue Entresto at 24-26 bid.  She will continue eplerenone at 25 QD.    She will continue Eliquis for stroke risk reduction.    I am repeating her echocardiogram and a full set of blood work.  She appears euvolemic with no signs of angina.  She will continue her aspirin and statin drug.  Her LDL is 63.  She will follow in one month.   They know to call the office with any issues.      [EKG obtained to assist in diagnosis and management of assessed problem(s)] : EKG obtained to assist in diagnosis and management of assessed problem(s)

## 2024-05-09 NOTE — HISTORY OF PRESENT ILLNESS
[FreeTextEntry1] : This is a 73 year old former smoker who presents to the office for a follow up cardiac evaluation after cardiac surgery.  .  In December of 2023 she presented to [facility] in [place] with acute decompensated heart failure.  She was found to be in atrial fibrillation as well.  She underwent a cardiac work up, which revealed a severely dilated ascending aorta measuring 6 cm, severe aortic insufficiency, moderate to severe mitral insufficiency, and a severe mid LAD stenosis.  She was diuresed, put on AC, started on GDMT, and sinus rhythm was restored.  She followed with Dr. Oro, and is now s/p ascending aortic replacement with a Dacron graft, bioprosthetic aortic valve replacement, mitral valve and tricuspid valve annuloplasty, 2V CABG with LIMA to LAD and SVG to RCA, RICKI ligation.  Her post operative course was complicated by jayy atrial fibrillation.  PPM was deferred.  I resumed Toprol.   She was discharged in NSR, but had been in and out of AF.   She seems mostly in sinus now.  She wore a second Zio, and had no recurrent AF.    She is in NSR today.   She reports her dyspnea is much improved compared to pre op.    She has incisional pain.  She denies anginal chest pains, PND, orthopnea, LE swelling  or syncope. She does feel dizzy at times.  HR and BP are low.   NO abnormal bleeding.  Overall she is recovering well.

## 2024-05-17 ENCOUNTER — MED ADMIN CHARGE (OUTPATIENT)
Age: 74
End: 2024-05-17

## 2024-05-17 ENCOUNTER — APPOINTMENT (OUTPATIENT)
Dept: CARDIOLOGY | Facility: CLINIC | Age: 74
End: 2024-05-17
Payer: MEDICARE

## 2024-05-17 PROCEDURE — 93306 TTE W/DOPPLER COMPLETE: CPT

## 2024-05-17 RX ORDER — PERFLUTREN 6.52 MG/ML
6.52 INJECTION, SUSPENSION INTRAVENOUS
Qty: 1 | Refills: 0 | Status: COMPLETED | OUTPATIENT
Start: 2024-05-17

## 2024-05-17 RX ADMIN — PERFLUTREN MG/ML: 6.52 INJECTION, SUSPENSION INTRAVENOUS at 00:00

## 2024-05-19 LAB
25(OH)D3 SERPL-MCNC: 12.8 NG/ML
ALBUMIN SERPL ELPH-MCNC: 4 G/DL
ALP BLD-CCNC: 88 U/L
ALT SERPL-CCNC: 16 U/L
ANION GAP SERPL CALC-SCNC: 12 MMOL/L
AST SERPL-CCNC: 21 U/L
BASOPHILS # BLD AUTO: 0.03 K/UL
BASOPHILS NFR BLD AUTO: 0.6 %
BILIRUB SERPL-MCNC: 0.7 MG/DL
BUN SERPL-MCNC: 12 MG/DL
CALCIUM SERPL-MCNC: 9 MG/DL
CHLORIDE SERPL-SCNC: 104 MMOL/L
CHOLEST SERPL-MCNC: 134 MG/DL
CO2 SERPL-SCNC: 24 MMOL/L
CREAT SERPL-MCNC: 0.74 MG/DL
EGFR: 86 ML/MIN/1.73M2
EOSINOPHIL # BLD AUTO: 0.1 K/UL
EOSINOPHIL NFR BLD AUTO: 2 %
ESTIMATED AVERAGE GLUCOSE: 117 MG/DL
GLUCOSE SERPL-MCNC: 86 MG/DL
HBA1C MFR BLD HPLC: 5.7 %
HCT VFR BLD CALC: 35.1 %
HDLC SERPL-MCNC: 55 MG/DL
HGB BLD-MCNC: 10.9 G/DL
IMM GRANULOCYTES NFR BLD AUTO: 0.2 %
LDLC SERPL CALC-MCNC: 61 MG/DL
LYMPHOCYTES # BLD AUTO: 1.48 K/UL
LYMPHOCYTES NFR BLD AUTO: 29.8 %
MAN DIFF?: NORMAL
MCHC RBC-ENTMCNC: 27.7 PG
MCHC RBC-ENTMCNC: 31.1 GM/DL
MCV RBC AUTO: 89.1 FL
MONOCYTES # BLD AUTO: 0.41 K/UL
MONOCYTES NFR BLD AUTO: 8.2 %
NEUTROPHILS # BLD AUTO: 2.94 K/UL
NEUTROPHILS NFR BLD AUTO: 59.2 %
NONHDLC SERPL-MCNC: 78 MG/DL
PLATELET # BLD AUTO: 178 K/UL
POTASSIUM SERPL-SCNC: 5.1 MMOL/L
PROT SERPL-MCNC: 6.3 G/DL
RBC # BLD: 3.94 M/UL
RBC # FLD: 15.3 %
SODIUM SERPL-SCNC: 140 MMOL/L
TRIGL SERPL-MCNC: 90 MG/DL
TSH SERPL-ACNC: 3.83 UIU/ML
WBC # FLD AUTO: 4.97 K/UL

## 2024-05-29 ENCOUNTER — RX RENEWAL (OUTPATIENT)
Age: 74
End: 2024-05-29

## 2024-05-30 ENCOUNTER — RX RENEWAL (OUTPATIENT)
Age: 74
End: 2024-05-30

## 2024-06-03 ENCOUNTER — RX RENEWAL (OUTPATIENT)
Age: 74
End: 2024-06-03

## 2024-06-24 ENCOUNTER — RX RENEWAL (OUTPATIENT)
Age: 74
End: 2024-06-24

## 2024-06-25 ENCOUNTER — RX RENEWAL (OUTPATIENT)
Age: 74
End: 2024-06-25

## 2024-06-25 RX ORDER — ATORVASTATIN CALCIUM 40 MG/1
40 TABLET, FILM COATED ORAL
Qty: 30 | Refills: 0 | Status: ACTIVE | COMMUNITY
Start: 2024-02-07 | End: 1900-01-01

## 2024-06-27 ENCOUNTER — RX RENEWAL (OUTPATIENT)
Age: 74
End: 2024-06-27

## 2024-08-27 ENCOUNTER — RX RENEWAL (OUTPATIENT)
Age: 74
End: 2024-08-27

## 2024-10-22 ENCOUNTER — APPOINTMENT (OUTPATIENT)
Dept: CARDIOLOGY | Facility: CLINIC | Age: 74
End: 2024-10-22
Payer: MEDICARE

## 2024-10-22 VITALS
SYSTOLIC BLOOD PRESSURE: 120 MMHG | WEIGHT: 141 LBS | HEART RATE: 60 BPM | DIASTOLIC BLOOD PRESSURE: 72 MMHG | HEIGHT: 64 IN | BODY MASS INDEX: 24.07 KG/M2

## 2024-10-22 DIAGNOSIS — I25.10 ATHEROSCLEROTIC HEART DISEASE OF NATIVE CORONARY ARTERY W/OUT ANGINA PECTORIS: ICD-10-CM

## 2024-10-22 DIAGNOSIS — I48.91 UNSPECIFIED ATRIAL FIBRILLATION: ICD-10-CM

## 2024-10-22 DIAGNOSIS — I50.20 UNSPECIFIED SYSTOLIC (CONGESTIVE) HEART FAILURE: ICD-10-CM

## 2024-10-22 PROCEDURE — 93000 ELECTROCARDIOGRAM COMPLETE: CPT

## 2024-10-22 PROCEDURE — 99214 OFFICE O/P EST MOD 30 MIN: CPT

## 2024-10-22 PROCEDURE — G2211 COMPLEX E/M VISIT ADD ON: CPT

## 2025-03-28 ENCOUNTER — APPOINTMENT (OUTPATIENT)
Dept: ORTHOPEDIC SURGERY | Facility: CLINIC | Age: 75
End: 2025-03-28
Payer: MEDICARE

## 2025-03-28 VITALS — BODY MASS INDEX: 26.08 KG/M2 | HEIGHT: 63.5 IN | WEIGHT: 149 LBS

## 2025-03-28 DIAGNOSIS — M25.569 PAIN IN UNSPECIFIED KNEE: ICD-10-CM

## 2025-03-28 DIAGNOSIS — M17.0 BILATERAL PRIMARY OSTEOARTHRITIS OF KNEE: ICD-10-CM

## 2025-03-28 PROCEDURE — 73564 X-RAY EXAM KNEE 4 OR MORE: CPT | Mod: 50

## 2025-03-28 PROCEDURE — 99203 OFFICE O/P NEW LOW 30 MIN: CPT

## 2025-04-01 ENCOUNTER — NON-APPOINTMENT (OUTPATIENT)
Age: 75
End: 2025-04-01

## 2025-04-01 ENCOUNTER — APPOINTMENT (OUTPATIENT)
Dept: CARDIOLOGY | Facility: CLINIC | Age: 75
End: 2025-04-01
Payer: MEDICARE

## 2025-04-01 VITALS
OXYGEN SATURATION: 96 % | HEART RATE: 65 BPM | HEIGHT: 63.5 IN | DIASTOLIC BLOOD PRESSURE: 75 MMHG | SYSTOLIC BLOOD PRESSURE: 123 MMHG

## 2025-04-01 DIAGNOSIS — I71.21 ANEURYSM OF THE ASCENDING AORTA, WITHOUT RUPTURE: ICD-10-CM

## 2025-04-01 DIAGNOSIS — I35.1 NONRHEUMATIC AORTIC (VALVE) INSUFFICIENCY: ICD-10-CM

## 2025-04-01 DIAGNOSIS — I48.91 UNSPECIFIED ATRIAL FIBRILLATION: ICD-10-CM

## 2025-04-01 PROCEDURE — 99214 OFFICE O/P EST MOD 30 MIN: CPT

## 2025-04-01 PROCEDURE — G2211 COMPLEX E/M VISIT ADD ON: CPT

## 2025-04-01 PROCEDURE — 93000 ELECTROCARDIOGRAM COMPLETE: CPT

## 2025-04-24 ENCOUNTER — RX RENEWAL (OUTPATIENT)
Age: 75
End: 2025-04-24

## 2025-04-28 ENCOUNTER — RX RENEWAL (OUTPATIENT)
Age: 75
End: 2025-04-28

## 2025-06-10 ENCOUNTER — APPOINTMENT (OUTPATIENT)
Dept: ORTHOPEDIC SURGERY | Facility: CLINIC | Age: 75
End: 2025-06-10
Payer: MEDICARE

## 2025-06-10 VITALS — HEIGHT: 63.5 IN | WEIGHT: 149 LBS | BODY MASS INDEX: 26.08 KG/M2

## 2025-06-10 PROBLEM — I24.0 BLOCKAGE OF CORONARY ARTERY OF HEART: Status: RESOLVED | Noted: 2025-06-10 | Resolved: 2025-06-10

## 2025-06-10 PROCEDURE — 20610 DRAIN/INJ JOINT/BURSA W/O US: CPT | Mod: 50

## 2025-06-10 PROCEDURE — 99214 OFFICE O/P EST MOD 30 MIN: CPT | Mod: 25

## 2025-06-17 ENCOUNTER — APPOINTMENT (OUTPATIENT)
Dept: ORTHOPEDIC SURGERY | Facility: CLINIC | Age: 75
End: 2025-06-17
Payer: MEDICARE

## 2025-06-17 PROCEDURE — 20610 DRAIN/INJ JOINT/BURSA W/O US: CPT | Mod: 50

## 2025-06-24 ENCOUNTER — APPOINTMENT (OUTPATIENT)
Dept: ORTHOPEDIC SURGERY | Facility: CLINIC | Age: 75
End: 2025-06-24
Payer: MEDICARE

## 2025-06-24 PROCEDURE — 20610 DRAIN/INJ JOINT/BURSA W/O US: CPT | Mod: 50

## 2025-07-08 ENCOUNTER — NON-APPOINTMENT (OUTPATIENT)
Age: 75
End: 2025-07-08

## 2025-07-08 ENCOUNTER — APPOINTMENT (OUTPATIENT)
Dept: CARDIOLOGY | Facility: CLINIC | Age: 75
End: 2025-07-08
Payer: MEDICARE

## 2025-07-08 VITALS
WEIGHT: 150 LBS | HEIGHT: 63.5 IN | BODY MASS INDEX: 26.25 KG/M2 | SYSTOLIC BLOOD PRESSURE: 115 MMHG | HEART RATE: 85 BPM | OXYGEN SATURATION: 93 % | DIASTOLIC BLOOD PRESSURE: 76 MMHG

## 2025-07-08 PROBLEM — R60.9 EDEMA: Status: ACTIVE | Noted: 2025-07-08

## 2025-07-08 PROCEDURE — 99215 OFFICE O/P EST HI 40 MIN: CPT

## 2025-07-08 PROCEDURE — G2211 COMPLEX E/M VISIT ADD ON: CPT

## 2025-07-08 PROCEDURE — 93000 ELECTROCARDIOGRAM COMPLETE: CPT

## 2025-07-08 RX ORDER — FUROSEMIDE 40 MG/1
40 TABLET ORAL DAILY
Qty: 30 | Refills: 0 | Status: ACTIVE | COMMUNITY
Start: 2025-07-08 | End: 1900-01-01

## 2025-08-01 ENCOUNTER — APPOINTMENT (OUTPATIENT)
Dept: CARDIOLOGY | Facility: CLINIC | Age: 75
End: 2025-08-01
Payer: MEDICARE

## 2025-08-01 ENCOUNTER — MED ADMIN CHARGE (OUTPATIENT)
Age: 75
End: 2025-08-01

## 2025-08-01 ENCOUNTER — APPOINTMENT (OUTPATIENT)
Dept: ORTHOPEDIC SURGERY | Facility: CLINIC | Age: 75
End: 2025-08-01
Payer: MEDICARE

## 2025-08-01 DIAGNOSIS — M17.12 UNILATERAL PRIMARY OSTEOARTHRITIS, LEFT KNEE: ICD-10-CM

## 2025-08-01 DIAGNOSIS — M17.11 UNILATERAL PRIMARY OSTEOARTHRITIS, RIGHT KNEE: ICD-10-CM

## 2025-08-01 PROCEDURE — 93306 TTE W/DOPPLER COMPLETE: CPT

## 2025-08-01 PROCEDURE — 99213 OFFICE O/P EST LOW 20 MIN: CPT | Mod: 25

## 2025-08-01 PROCEDURE — J3490M: CUSTOM | Mod: NC

## 2025-08-01 PROCEDURE — 20610 DRAIN/INJ JOINT/BURSA W/O US: CPT | Mod: 50

## 2025-08-01 RX ORDER — TRAMADOL HYDROCHLORIDE AND ACETAMINOPHEN 37.5; 325 MG/1; MG/1
37.5-325 TABLET, FILM COATED ORAL EVERY 6 HOURS
Qty: 20 | Refills: 0 | Status: ACTIVE | COMMUNITY
Start: 2025-08-01 | End: 1900-01-01

## 2025-08-01 RX ADMIN — PERFLUTREN MG/ML: 6.52 INJECTION, SUSPENSION INTRAVENOUS at 00:00

## 2025-08-02 RX ORDER — PERFLUTREN 6.52 MG/ML
6.52 INJECTION, SUSPENSION INTRAVENOUS
Qty: 1 | Refills: 0 | Status: COMPLETED | OUTPATIENT
Start: 2025-08-01

## 2025-08-05 ENCOUNTER — RX RENEWAL (OUTPATIENT)
Age: 75
End: 2025-08-05

## 2025-08-25 ENCOUNTER — RX RENEWAL (OUTPATIENT)
Age: 75
End: 2025-08-25

## 2025-09-02 ENCOUNTER — RX RENEWAL (OUTPATIENT)
Age: 75
End: 2025-09-02